# Patient Record
Sex: FEMALE | Race: WHITE | NOT HISPANIC OR LATINO | Employment: FULL TIME | URBAN - METROPOLITAN AREA
[De-identification: names, ages, dates, MRNs, and addresses within clinical notes are randomized per-mention and may not be internally consistent; named-entity substitution may affect disease eponyms.]

---

## 2023-01-20 ENCOUNTER — OFFICE VISIT (OUTPATIENT)
Dept: FAMILY MEDICINE | Facility: CLINIC | Age: 33
End: 2023-01-20
Payer: COMMERCIAL

## 2023-01-20 VITALS
TEMPERATURE: 98.5 F | SYSTOLIC BLOOD PRESSURE: 113 MMHG | WEIGHT: 130 LBS | OXYGEN SATURATION: 100 % | HEART RATE: 79 BPM | DIASTOLIC BLOOD PRESSURE: 75 MMHG | BODY MASS INDEX: 19.7 KG/M2 | HEIGHT: 68 IN

## 2023-01-20 DIAGNOSIS — K52.9 CHRONIC DIARRHEA: Primary | ICD-10-CM

## 2023-01-20 DIAGNOSIS — R05.2 SUBACUTE COUGH: ICD-10-CM

## 2023-01-20 LAB
ALBUMIN SERPL-MCNC: 3.9 G/DL (ref 3.4–5)
ALP SERPL-CCNC: 48 U/L (ref 40–150)
ALT SERPL W P-5'-P-CCNC: 19 U/L (ref 0–50)
ANION GAP SERPL CALCULATED.3IONS-SCNC: 7 MMOL/L (ref 3–14)
AST SERPL W P-5'-P-CCNC: 12 U/L (ref 0–45)
BILIRUB SERPL-MCNC: 0.5 MG/DL (ref 0.2–1.3)
BUN SERPL-MCNC: 17 MG/DL (ref 7–30)
CALCIUM SERPL-MCNC: 9.1 MG/DL (ref 8.5–10.1)
CHLORIDE BLD-SCNC: 108 MMOL/L (ref 94–109)
CO2 SERPL-SCNC: 25 MMOL/L (ref 20–32)
CREAT SERPL-MCNC: 0.83 MG/DL (ref 0.52–1.04)
ERYTHROCYTE [DISTWIDTH] IN BLOOD BY AUTOMATED COUNT: 16.1 % (ref 10–15)
GFR SERPL CREATININE-BSD FRML MDRD: >90 ML/MIN/1.73M2
GLUCOSE BLD-MCNC: 95 MG/DL (ref 70–99)
HCT VFR BLD AUTO: 37.3 % (ref 35–47)
HGB BLD-MCNC: 12.2 G/DL (ref 11.7–15.7)
MCH RBC QN AUTO: 29.3 PG (ref 26.5–33)
MCHC RBC AUTO-ENTMCNC: 32.7 G/DL (ref 31.5–36.5)
MCV RBC AUTO: 89 FL (ref 78–100)
PLATELET # BLD AUTO: 199 10E3/UL (ref 150–450)
POTASSIUM BLD-SCNC: 4.1 MMOL/L (ref 3.4–5.3)
PROT SERPL-MCNC: 7.5 G/DL (ref 6.8–8.8)
RBC # BLD AUTO: 4.17 10E6/UL (ref 3.8–5.2)
SODIUM SERPL-SCNC: 140 MMOL/L (ref 133–144)
TSH SERPL DL<=0.005 MIU/L-ACNC: 2.6 MU/L (ref 0.4–4)
WBC # BLD AUTO: 6 10E3/UL (ref 4–11)

## 2023-01-20 PROCEDURE — 36415 COLL VENOUS BLD VENIPUNCTURE: CPT | Performed by: NURSE PRACTITIONER

## 2023-01-20 PROCEDURE — 86364 TISS TRNSGLTMNASE EA IG CLAS: CPT | Performed by: NURSE PRACTITIONER

## 2023-01-20 PROCEDURE — 99204 OFFICE O/P NEW MOD 45 MIN: CPT | Performed by: NURSE PRACTITIONER

## 2023-01-20 PROCEDURE — 85027 COMPLETE CBC AUTOMATED: CPT | Performed by: NURSE PRACTITIONER

## 2023-01-20 PROCEDURE — 84443 ASSAY THYROID STIM HORMONE: CPT | Performed by: NURSE PRACTITIONER

## 2023-01-20 PROCEDURE — 80053 COMPREHEN METABOLIC PANEL: CPT | Performed by: NURSE PRACTITIONER

## 2023-01-20 RX ORDER — AZITHROMYCIN 250 MG/1
TABLET, FILM COATED ORAL
Qty: 6 TABLET | Refills: 0 | Status: SHIPPED | OUTPATIENT
Start: 2023-01-20 | End: 2023-01-25

## 2023-01-20 RX ORDER — DICYCLOMINE HYDROCHLORIDE 10 MG/1
10 CAPSULE ORAL 4 TIMES DAILY PRN
Qty: 60 CAPSULE | Refills: 2 | Status: SHIPPED | OUTPATIENT
Start: 2023-01-20

## 2023-01-20 NOTE — PROGRESS NOTES
Assessment & Plan     Chronic diarrhea  Symptoms dating back to around age 16.  Has been told she has IBS in the past.   Labs in process.    Patient given the following information/instructions-  Look up FODMAPS diet and try this.  Would recommend against a bunch of herbs, no evidence that they are effective.     Start Metamucil fiber powder- 1 tablespoon in a glass of water daily.   Start Imodium daily as needed for diarrhea, can take up to 8 pills in a 24 hour period, but would recommend starting with 1 pill daily in the morning.  If you get constipated, back off on the Imodium.   Try dicyclomine up to 4 times per day for abdominal pain and cramping  Referral to GI- schedule now because they will probably be a few months out.   - Adult GI  Referral - Consult Only; Future  - dicyclomine (BENTYL) 10 MG capsule; Take 1 capsule (10 mg) by mouth 4 times daily as needed (abdominal pain, cramping)  - Comprehensive metabolic panel (BMP + Alb, Alk Phos, ALT, AST, Total. Bili, TP)  - Tissue transglutaminase domenico IgA and IgG  - CBC with platelets  - TSH with free T4 reflex    Subacute cough  Lungs clear on exam. Persistent cough following viral illness about 1.5 weeks ago. No fever, SOB or wheezing.  Start zpak.  Follow-up if not improving.   - azithromycin (ZITHROMAX) 250 MG tablet; Take 2 tablets (500 mg) by mouth daily for 1 day, THEN 1 tablet (250 mg) daily for 4 days.        Return in about 2 weeks (around 2/3/2023) for If failure to improve, or sooner if worsening.    Leyla Bartlett CNP  [facility]    Krista Ramirez is a 32 year old, presenting for the following health issues:  Cough      History of Present Illness       Reason for visit:  Stomach intestine colon issues chronic cough left rib cage discomfort getting sick a lot this year  Symptom onset:  More than a month  Symptoms include:  Corgh left rib cage discomfort  Symptom intensity:  Moderate  Symptom progression:   "Staying the same  Had these symptoms before:  No  What makes it worse:  No  What makes it better:  No    She eats 4 or more servings of fruits and vegetables daily.She consumes 1 sweetened beverage(s) daily.She exercises with enough effort to increase her heart rate 9 or less minutes per day.  She exercises with enough effort to increase her heart rate 3 or less days per week.   She is taking medications regularly.       Here to establish care today.   Works as a dental assistant.     Has two concerns today.     Flu around Thanksgiving.  Resolved but had lingering cough.  Cough seemed to get worse, having cough attacks.  Very intense.  Developed pain along lower left rib cage.  Was also doing a lot of snow shoveling around this time.   Pain is mostly positional and with palpation.  No significant pain with inspiration.     Still occasionally has cough.  Not as bad as before.   A lot of throat clearing.    Denies SOB.     Past history of IBS, diagnosed around age 16.  Symptoms included abnormal bowel movements with alternating constipation and diarrhea.  Stabbing abdominal pains.  BM issue now is primarily diarrhea.  Stools are like sludge.  Feels like she is not emptying completely. Around 3 BMs daily.  Went to a functional doctor, tried herbal treatment.  Didn't really help.  Tries to eat healthy.  Has tried a lot of dietary changes, never really helps for long.  Exercises.  Did phone consult with [facility] in October 2022.  States was prescribed two medications, doesn't remember names.  Didn't start either of them.  One of them was $2000 and she couldn't afford it.  Tried diet changes and some herbal supplements- turkey tail, lions diallo and others.  Not helping. Denies previous lab work-up or colonoscopy.  Denies family hx of IBD.       Review of Systems   ROS: 10 point ROS neg other than the symptoms noted above in the HPI.         Objective    /75   Pulse 79   Temp 98.5  F (36.9  C)   Ht 1.727 m (5' 8\")   " Wt 59 kg (130 lb)   SpO2 100%   BMI 19.77 kg/m    Body mass index is 19.77 kg/m .  Physical Exam   GENERAL: healthy, alert and no distress  NECK: no adenopathy, no asymmetry, masses, or scars and thyroid normal to palpation  RESP: lungs clear to auscultation - no rales, rhonchi or wheezes  CV: regular rate and rhythm, normal S1 S2, no S3 or S4, no murmur, click or rub, no peripheral edema and peripheral pulses strong  ABDOMEN: soft, nontender, no hepatosplenomegaly, no masses and bowel sounds normal  MS: no gross musculoskeletal defects noted, no edema  SKIN: no suspicious lesions or rashes  NEURO: Normal strength and tone, mentation intact and speech normal

## 2023-01-20 NOTE — PATIENT INSTRUCTIONS
Cough- start Zpak.  If cough is not fully resolved in the next 2-3 weeks, please let me know and we'll check a chest xray.     Bowels-  Likely IBS  Will check labs today.   Look up FODMAPS diet and try this.  Would recommend against a bunch of herbs, no evidence that they are effective.     Start Metamucil fiber powder- 1 tablespoon in a glass of water daily.   Start Imodium daily as needed for diarrhea, can take up to 8 pills in a 24 hour period, but would recommend starting with 1 pill daily in the morning.  If you get constipated, back off on the Imodium.   Try dicyclomine up to 4 times per day for abdominal pain and cramping    Referral to GI- schedule now because they will probably be a few months out.

## 2023-01-23 LAB
TTG IGA SER-ACNC: 0.5 U/ML
TTG IGG SER-ACNC: 0.7 U/ML

## 2023-01-23 NOTE — TELEPHONE ENCOUNTER
REFERRAL INFORMATION:    Referring Provider:  Leyla Bartlett CNP    Referring Clinic:  [facility]    Reason for Visit/Diagnosis: Chronic diarrhea     FUTURE VISIT INFORMATION:    Appointment Date: 2/17/2023     NOTES STATUS DETAILS   OFFICE NOTE from Referring Provider Internal 1/20/2023 Office visit with ROSELINE Bartlett   OFFICE NOTE from Other Specialist N/A    HOSPITAL DISCHARGE SUMMARY/  ED VISITS N/A    OPERATIVE REPORT N/A    MEDICATION LIST Internal         ENDOSCOPY  N/A    COLONOSCOPY N/A    ERCP N/A    EUS N/A    STOOL TESTING N/A    PERTINENT LABS N/A    PATHOLOGY REPORTS (RELATED) N/A    IMAGING (CT, MRI, EGD, MRCP, Small Bowel Follow Through/SBT, MR/CT Enterography) N/A

## 2023-02-12 ENCOUNTER — HEALTH MAINTENANCE LETTER (OUTPATIENT)
Age: 33
End: 2023-02-12

## 2023-02-17 ENCOUNTER — PRE VISIT (OUTPATIENT)
Dept: GASTROENTEROLOGY | Facility: CLINIC | Age: 33
End: 2023-02-17

## 2023-02-17 ENCOUNTER — OFFICE VISIT (OUTPATIENT)
Dept: GASTROENTEROLOGY | Facility: CLINIC | Age: 33
End: 2023-02-17
Attending: NURSE PRACTITIONER
Payer: COMMERCIAL

## 2023-02-17 VITALS
BODY MASS INDEX: 20 KG/M2 | DIASTOLIC BLOOD PRESSURE: 72 MMHG | WEIGHT: 132 LBS | HEIGHT: 68 IN | SYSTOLIC BLOOD PRESSURE: 108 MMHG

## 2023-02-17 DIAGNOSIS — K58.0 IRRITABLE BOWEL SYNDROME WITH DIARRHEA: Primary | ICD-10-CM

## 2023-02-17 DIAGNOSIS — K52.9 CHRONIC DIARRHEA: ICD-10-CM

## 2023-02-17 PROCEDURE — 99204 OFFICE O/P NEW MOD 45 MIN: CPT | Performed by: NURSE PRACTITIONER

## 2023-02-17 RX ORDER — LOPERAMIDE HCL 2 MG
2 CAPSULE ORAL PRN
COMMUNITY

## 2023-02-17 ASSESSMENT — PAIN SCALES - GENERAL: PAINLEVEL: NO PAIN (0)

## 2023-02-17 NOTE — PATIENT INSTRUCTIONS
"It was a pleasure taking care of you today.  I've included a brief summary of our discussion and care plan from today's visit below.  Please review this information with your primary care provider.  ______________________________________________________________________    My recommendations are summarized as follows:    As we discussed today, I placed a referral to colonoscopy.    2. Continue fiber supplement, slowly increasing the dose to 15 gram a day.    3. Use loperamide (Imodium) as needed. Take 1/2 of the tablet after 2 loose of mushy stools. OK to repeat the dose if needed.    4. Try a low FODMAP diet for a few weeks.    Return to GI Clinic in 3 months to review your progress.    ______________________________________________________________________    Irritable bowel syndrome (IBS)   Irritable bowel syndrome (IBS) is a chronic condition of the digestive system. Its primary symptoms are abdominal pain and changes in bowel habits (eg, constipation and/or diarrhea).  There are a number of theories about how and why irritable bowel syndrome (IBS) develops. Despite intensive research, the cause is not clear.   -One theory suggests that IBS is caused by abnormal contractions of the colon and intestines (hence the term \"spastic bowel,\" which has sometimes been used to describe IBS).   -Some people develop IBS after a severe gastrointestinal infection (eg, Salmonella or Campylobacter, or viruses). However, it is not clear how the infection triggers IBS to develop, and most people with IBS do not have a history of these infections.  -People with IBS who seek medical help are more likely to suffer from anxiety and stress than those who do not seek help. Stress and anxiety are known to affect the intestine; thus, it is likely that anxiety and stress worsen symptoms.    -Food intolerances are common in patients with IBS, raising the possibility that it is caused by food sensitivity or allergy. This theory has been " "difficult to prove, although it continues to be studied.   A number of foods are known to cause symptoms that mimic or aggravate IBS, including dairy products (which contain lactose), legumes (such as beans), and cruciferous vegetables (such as broccoli, cauliflower, [place] sprouts, and cabbage). These foods increase intestinal gas, which can cause cramps.    -Many researchers believe that IBS is caused by heightened sensitivity of the intestines. The medical term for this is \"visceral hyperalgesia.\" This theory proposes that nerves in the bowels are overactive in people with IBS, so that normal amounts of gas or movement are perceived as excessive and painful.     A person with irritable bowel syndrome may have frequent loose stools. Bowel movements usually occur during the daytime, and most often in the morning or after meals. Diarrhea is often preceded by a sense of extreme urgency and followed by a feeling of incomplete emptying. About one-half of people with IBS also notice mucous discharge with diarrhea. Diarrhea occurring during the night is very unusual with IBS.     Treatments are often given to reduce the pain and other symptoms of IBS, and it may be necessary to try more than one combination of treatments to find the one that is most helpful for you.  Diet:  The first step in treating IBS is usually to monitor your symptoms, daily bowel habits, and any other factors that may affect your bowels. This can help to identify factors that worsen symptoms in some people with IBS, such as lactose or other food intolerances and stress. Keeping a daily diary to track your diet and bowel symptoms can be helpful.  Many clinicians recommend temporarily eliminating milk products, since lactose intolerance is common and can aggravate IBS or cause symptoms similar to IBS. The greatest concentration of lactose is found in milk and ice cream, although it is present in smaller quantities in yogurt, cottage and other " cheeses.Try eliminating dairy for 2 weeks. If IBS symptoms improve, it is reasonable to continue avoiding lactose.   Many foods are only partially digested in the small intestines. When they reach the colon (large intestine), further digestion takes place, which may cause gas and cramps. Eliminating these foods temporarily is reasonable if gas or bloating is bothersome.  The most common gas-producing foods are legumes (such as beans) and cruciferous vegetables (such as cabbage, [place] sprouts, cauliflower, and broccoli). In addition, some people have trouble with onions, celery, carrots, raisins, bananas, apricots, prunes, sprouts, and wheat.    A bulk-forming fiber supplement, such as Psyllium, may also be recommended to increase fiber intake since it is difficult to consume enough fiber in the diet. Fiber supplements should be started at a low dose and increased slowly over several weeks to reduce the symptoms of excessive intestinal gas, which can occur in some people when beginning fiber therapy.     Some foods are easy to digest for people with IBS related diarrhea. These include the following: plain pasta or noodles, white rice. Boiled or baked potato. Whole white bread, Taiwanese bread, plain fish, plain chicken or turkey, soft-boiled eggs, rice or soy milk, cooked carrots,and cereals (plain Cornflakes, Rice Krispies, Corn or Rice Chex, or Cheerios).    Other approaches to management of IBS:  Stress and anxiety can worsen IBS in some people. The best approach for reducing stress and anxiety depends upon your situation and the severity of your symptoms.  Although many drugs are available to treat the symptoms of IBS, these drugs do not cure the condition. They are mainly used to relieve symptoms.      FODMAP:  The FODMAP term was coined by Turkmen researchers Juany Warner and Seb Barkley. They found that a low-FODMAP diet helped 75-85% of patients with irritable bowel syndrome or IBS.  Products  containing lactose (dairy), fructose (fruit sugar),sweeteners (sorbitol, mannitol), fructans (a type of fiber found in wheat, onions, garlic and chicory root), and GOS (a type of fiber found in beans, hummus and soy milk) are examples of FODMAPs.  They can be poorly absorbed during the digestive process. They are rapidly fermented by the bacteria that live in your gut. They are capable of pulling fluid into the gut in a process called osmosis. The increased fluid load, along with the type and amount of gas produced, cause distension and motility changes, leading to bloating, abdominal pain, diarrhea, and nausea. Symptoms are often delayed until hours after eating a high FODMAP meal or snack, because it takes time for FODMAPs to make their way through the stomach and into the intestines, where the effects occur. By reducing the overall dietary load of these carbohydrates, troublesome GI symptoms can be minimized or eliminated.     A low-FODMAP diet avoids foods containing certain sugars and certain fibers capable of causing diarrhea, constipation, gas, bloating and abdominal pain in people with IBS. At the same time, I would recommend not to exclude all of the FODMAPs foods completely from your diet, but instead, to use alternative foods from the same group. Try the diet for 4-6 weeks, if you have not noticed significant changes in your symptoms, stop the diet. Discuss further plan with your primary care provider or gastroenterology provider.              ______________________________________________________________________    Who do I call with any questions after my visit?  Please be in touch if there are any further questions that arise following today's visit.  There are multiple ways to contact your gastroenterology care team.      During business hours, you may reach a Gastroenterology nurse at [phone], option 3.     To schedule or reschedule an appointment, please call [phone].   To schedule your  imaging studies (CT, MRI, ultrasound)  call [phone] (or toll-free # [phone])  To schedule your lab work at [facility], please call [phone]    You can always send a secure message through Augur.  Augur messages are answered by your nurse or doctor typically within 24 hours.  Please allow extra time on weekends and holidays.      For urgent/emergent questions after business hours, you may reach the on-call GI Fellow by contacting the [facility]  at [phone].    In order for your refill to be processed in a timely fashion, it is your responsibility to ensure you follow the recommendations from your provider regarding your laboratory studies and follow up appointments.       How will I get the results of any tests ordered?    You will receive all of your results.  If you have signed up for InforS[employer], any tests ordered at your visit will be available to you after your physician reviews them.  Typically this takes 1-2 weeks.  If there are urgent results that require a change in your care plan, your physician or nurse will call you to discuss the next steps.   What is Augur?  Augur is a secure way for you to access all of your healthcare records from the [facility].  It is a web based computer program, so you can sign on to it from any location.  It also allows you to send secure messages to your care team.  I recommend signing up for Augur access if you have not already done so and are comfortable with using a computer.    How to I schedule a follow-up visit?  If you did not schedule a follow-up visit today, please call [phone] to schedule a follow-up office visit.      Sincerely,  GEOVANNY Mcguire,  [facility],  Division of Gastroenterology   ([facility])

## 2023-02-17 NOTE — PROGRESS NOTES
"Gastroenterology CLINIC VISIT, NEW PATIENT    CC/REFERRING PROVIDER: Leyla Bartlett  REASON FOR CONSULTATION: Chronic diarrhea    HPI: 32 year old female presenting to GI clinic for evaluation and management of chronic diarrhea.  Patient stated that she has been having diarrhea since her childhood, but over the past few years it became worse.  Stated that she used to have alternation of diarrhea and formed stools, but now they are always \"slushy\" and sticky.  She was able to control her symptoms with diet before, but now it does not work. Noted correlation between anxiety and stress and her symptoms.   Stated that a few months ago she had significant amount of abdominal cramping and diarrhea.  Says she tried a very strict diet -mainly consuming rice, applesauce and some protein.  Noted improvement in her symptoms in a few weeks, but it has a temporarily effect only.  She was seen by homeopathic provider for about 10 weeks.  Said that initially the treatment was helping. Patient also tried over-the-counter tinctures (turkey tail) with no significant improvement in her symptoms.    She suspects that she might have hemorrhoids.  Experiences episodes of anal burning and itching.  Tried Preparation H in the past.  Currently, she is using witch hazel wipes, which are somehow effective.  Patient stated that she is feeling frustrated and tired of not being able to have a normal formed bowel movement.  She requests additional evaluation or studies to find out if she potentially could have Crohn disease or any other pathology of her colon.    Patient stated that she saw gastroenterology at [facility] approximately 2 years ago.  Was prescribed \"an expensive antibiotic\" which she could not afford.  Recently, she was seen by her PCP who recommended to try Metamucil, dicyclomine, and Imodium.  Had lab work that came back unremarkable- normal TSH, CBC, TTG, and CMP.  Patient stated that she started using a dextran fiber, but did " "not try dicyclomine or Imodium.    ROS: 10pt ROS performed and otherwise negative.    PAST MEDICAL HISTORY:  No past medical history on file.    PREVIOUS ABDOMINAL/GYNECOLOGIC SURGERIES:    No past surgical history on file.      PERTINENT MEDICATIONS:  Current Outpatient Medications   Medication Sig Dispense Refill     dicyclomine (BENTYL) 10 MG capsule Take 1 capsule (10 mg) by mouth 4 times daily as needed (abdominal pain, cramping) 60 capsule 2     loperamide (IMODIUM) 2 MG capsule Take 2 mg by mouth as needed for diarrhea       psyllium (METAMUCIL/KONSYL) 58.6 % powder Take by mouth daily         No other OTC/herbal/supplements reported by patient.    SOCIAL HISTORY:  Social History     Socioeconomic History     Marital status: Single     Spouse name: Not on file     Number of children: Not on file     Years of education: Not on file     Highest education level: Not on file   Occupational History     Not on file   Tobacco Use     Smoking status: Never     Smokeless tobacco: Never   Vaping Use     Vaping Use: Never used   Substance and Sexual Activity     Alcohol use: Not on file     Drug use: Not on file     Sexual activity: Not on file   Other Topics Concern     Not on file   Social History Narrative     Not on file     Social Determinants of Health     Financial Resource Strain: Not on file   Food Insecurity: Not on file   Transportation Needs: Not on file   Physical Activity: Not on file   Stress: Not on file   Social Connections: Not on file   Intimate Partner Violence: Not on file   Housing Stability: Not on file       FAMILY HISTORY:  Denies colon/panc/esophageal/other GI CA, no other Davalos or other HPS-related John. No IBD/celiac, no other AI/liver/thyroid disease.    No family history on file.    PHYSICAL EXAMINATION:  Vitals reviewed  /72 (BP Location: Right arm, Patient Position: Sitting, Cuff Size: Adult Regular)   Ht 1.727 m (5' 8\")   Wt 59.9 kg (132 lb)   LMP 02/10/2023 (Exact Date)   " "Breastfeeding No   BMI 20.07 kg/m      General: Patient appears well in no acute distress.   Skin: No visualized rash or lesions on visualized skin  Eyes: EOMI, no erythema, sclera icterus or discharge noted  Resp: breathing comfortably without accessory muscle usage, speaking in full sentences, no cough  Lung sounds clear  Card: Regular and rhythmic S1 and S2. No murmur,gallop, or rub  Abdomen: Active bowel sounds X 4 quadrants. Soft to palpation, no guarding or rebound tenderness   MSK: Appears to have normal range of motion based on visualized movements  Neurologic: No apparent tremors, facial movements symmetric  Psych: affect normal, alert and oriented      PERTINENT STUDIES Reviewed in EMR    ASSESSMENT/PLAN:  32 year old female  presented  to GI clinic for evaluation and management of persistent soft to loose stools with some associated belching and bloating.  Patient reported having 2-3 loose stools a day.  Onset of symptoms in her teen age.  Patient reported that her stool consistency and frequency becomes worse in the past 2 years.  Stated that she now always has \"slushy\" stools.  Complains of occasional abdominal cramping.  Denies any red flag symptoms. She denies any weight loss, black stools, fever or chills, abdominal pain, nausea or vomiting, dysphagia, fatigue, joint pain, or skin rash.  Recently, she was screened for anemia and celiac disease and had negative test results.    We had a long discussion on  IBS-D as the most likely etiology of her symptoms.  We also talked about infection, malabsorption, IBD, microscopic colitis, and food intolerance and other possible causes of diarrhea.  Patient denies family history of IBD.  She does not smoke, does not consume alcohol, does not use illicit drugs.  Has not been taking any NSAIDs.  I recommended to start a small dose of tricyclic antidepressant, optimize fiber supplementation, use loperamide as needed, and try a low FODMAP diet.  Patient prefers to " proceed with colonoscopy to exclude any other potential etiologies of her symptoms.  Order was placed.  I also offered hydrocortisone and Americaine creams for management of her anal discomfort, but patient declined.  She stated that witch hazel works pretty well at this time.      ICD-10-CM    1. Irritable bowel syndrome with diarrhea  K58.0 Adult GI  Referral - Procedure Only      2. Chronic diarrhea  K52.9 Adult GI  Referral - Consult Only     Adult GI  Referral - Procedure Only          Patient verbalized understanding and appreciation of care provided. Stated that all of the questions were answered to her/his satisfaction.    Additional 12 minutes on the date of service was spent performing the following:   -Preparing to see the patient (eg, review of tests,providers progress notes, medical/surgical history,etc)   -Ordering medications, tests, or procedures   -Documenting clinical information in the electronic or other health record     RTC in 3 months    Thank you for this consultation. It was a pleasure to participate in the care of this patient; please contact us with any further questions.    GEREMIAS Mcguire, FNP-C  [facility]  Gastroenterology Department  [place], [place]    This note was created with Dragon voice recognition software, and while reviewed for accuracy, inadvertent minor typographic errors may occur. Please contact the provider if you have any questions.

## 2023-02-17 NOTE — LETTER
"    2/17/2023         RE: Ashley Salazar  [address] [facility] [address]        Dear Colleague,    Thank you for referring your patient, Ashley Salazar, to the [facility]. Please see a copy of my visit note below.    Gastroenterology CLINIC VISIT, NEW PATIENT    CC/REFERRING PROVIDER: Leyla Bartlett  REASON FOR CONSULTATION: Chronic diarrhea    HPI: 32 year old female presenting to GI clinic for evaluation and management of chronic diarrhea.  Patient stated that she has been having diarrhea since her childhood, but over the past few years it became worse.  Stated that she used to have alternation of diarrhea and formed stools, but now they are always \"slushy\" and sticky.  She was able to control her symptoms with diet before, but now it does not work. Noted correlation between anxiety and stress and her symptoms.   Stated that a few months ago she had significant amount of abdominal cramping and diarrhea.  Says she tried a very strict diet -mainly consuming rice, applesauce and some protein.  Noted improvement in her symptoms in a few weeks, but it has a temporarily effect only.  She was seen by homeopathic provider for about 10 weeks.  Said that initially the treatment was helping. Patient also tried over-the-counter tinctures (turkey tail) with no significant improvement in her symptoms.    She suspects that she might have hemorrhoids.  Experiences episodes of anal burning and itching.  Tried Preparation H in the past.  Currently, she is using witch hazel wipes, which are somehow effective.  Patient stated that she is feeling frustrated and tired of not being able to have a normal formed bowel movement.  She requests additional evaluation or studies to find out if she potentially could have Crohn disease or any other pathology of her colon.    Patient stated that she saw gastroenterology at [facility] approximately 2 years ago.  Was prescribed \"an expensive antibiotic\" which she could not " afford.  Recently, she was seen by her PCP who recommended to try Metamucil, dicyclomine, and Imodium.  Had lab work that came back unremarkable- normal TSH, CBC, TTG, and CMP.  Patient stated that she started using a dextran fiber, but did not try dicyclomine or Imodium.    ROS: 10pt ROS performed and otherwise negative.    PAST MEDICAL HISTORY:  No past medical history on file.    PREVIOUS ABDOMINAL/GYNECOLOGIC SURGERIES:    No past surgical history on file.      PERTINENT MEDICATIONS:  Current Outpatient Medications   Medication Sig Dispense Refill     dicyclomine (BENTYL) 10 MG capsule Take 1 capsule (10 mg) by mouth 4 times daily as needed (abdominal pain, cramping) 60 capsule 2     loperamide (IMODIUM) 2 MG capsule Take 2 mg by mouth as needed for diarrhea       psyllium (METAMUCIL/KONSYL) 58.6 % powder Take by mouth daily         No other OTC/herbal/supplements reported by patient.    SOCIAL HISTORY:  Social History     Socioeconomic History     Marital status: Single     Spouse name: Not on file     Number of children: Not on file     Years of education: Not on file     Highest education level: Not on file   Occupational History     Not on file   Tobacco Use     Smoking status: Never     Smokeless tobacco: Never   Vaping Use     Vaping Use: Never used   Substance and Sexual Activity     Alcohol use: Not on file     Drug use: Not on file     Sexual activity: Not on file   Other Topics Concern     Not on file   Social History Narrative     Not on file     Social Determinants of Health     Financial Resource Strain: Not on file   Food Insecurity: Not on file   Transportation Needs: Not on file   Physical Activity: Not on file   Stress: Not on file   Social Connections: Not on file   Intimate Partner Violence: Not on file   Housing Stability: Not on file       FAMILY HISTORY:  Denies colon/panc/esophageal/other GI CA, no other Davalos or other HPS-related John. No IBD/celiac, no other AI/liver/thyroid  "disease.    No family history on file.    PHYSICAL EXAMINATION:  Vitals reviewed  /72 (BP Location: Right arm, Patient Position: Sitting, Cuff Size: Adult Regular)   Ht 1.727 m (5' 8\")   Wt 59.9 kg (132 lb)   LMP 02/10/2023 (Exact Date)   Breastfeeding No   BMI 20.07 kg/m      General: Patient appears well in no acute distress.   Skin: No visualized rash or lesions on visualized skin  Eyes: EOMI, no erythema, sclera icterus or discharge noted  Resp: breathing comfortably without accessory muscle usage, speaking in full sentences, no cough  Lung sounds clear  Card: Regular and rhythmic S1 and S2. No murmur,gallop, or rub  Abdomen: Active bowel sounds X 4 quadrants. Soft to palpation, no guarding or rebound tenderness   MSK: Appears to have normal range of motion based on visualized movements  Neurologic: No apparent tremors, facial movements symmetric  Psych: affect normal, alert and oriented      PERTINENT STUDIES Reviewed in EMR    ASSESSMENT/PLAN:  32 year old female  presented  to GI clinic for evaluation and management of persistent soft to loose stools with some associated belching and bloating.  Patient reported having 2-3 loose stools a day.  Onset of symptoms in her teen age.  Patient reported that her stool consistency and frequency becomes worse in the past 2 years.  Stated that she now always has \"slushy\" stools.  Complains of occasional abdominal cramping.  Denies any red flag symptoms. She denies any weight loss, black stools, fever or chills, abdominal pain, nausea or vomiting, dysphagia, fatigue, joint pain, or skin rash.  Recently, she was screened for anemia and celiac disease and had negative test results.    We had a long discussion on  IBS-D as the most likely etiology of her symptoms.  We also talked about infection, malabsorption, IBD, microscopic colitis, and food intolerance and other possible causes of diarrhea.  Patient denies family history of IBD.  She does not smoke, does not " consume alcohol, does not use illicit drugs.  Has not been taking any NSAIDs.  I recommended to start a small dose of tricyclic antidepressant, optimize fiber supplementation, use loperamide as needed, and try a low FODMAP diet.  Patient prefers to proceed with colonoscopy to exclude any other potential etiologies of her symptoms.  Order was placed.  I also offered hydrocortisone and Americaine creams for management of her anal discomfort, but patient declined.  She stated that witch hazel works pretty well at this time.      ICD-10-CM    1. Irritable bowel syndrome with diarrhea  K58.0 Adult GI  Referral - Procedure Only      2. Chronic diarrhea  K52.9 Adult GI  Referral - Consult Only     Adult GI  Referral - Procedure Only          Patient verbalized understanding and appreciation of care provided. Stated that all of the questions were answered to her/his satisfaction.    Additional 12 minutes on the date of service was spent performing the following:   -Preparing to see the patient (eg, review of tests,providers progress notes, medical/surgical history,etc)   -Ordering medications, tests, or procedures   -Documenting clinical information in the electronic or other health record     RTC in 3 months    Thank you for this consultation. It was a pleasure to participate in the care of this patient; please contact us with any further questions.    GEREMIAS Mcguire, FNP-C  [facility]  Gastroenterology Department  [place], [place]    This note was created with Dragon voice recognition software, and while reviewed for accuracy, inadvertent minor typographic errors may occur. Please contact the provider if you have any questions.      Again, thank you for allowing me to participate in the care of your patient.        Sincerely,        GEREMIAS MCGUIRE CNP

## 2024-03-10 ENCOUNTER — HEALTH MAINTENANCE LETTER (OUTPATIENT)
Age: 34
End: 2024-03-10

## 2025-03-16 ENCOUNTER — HEALTH MAINTENANCE LETTER (OUTPATIENT)
Age: 35
End: 2025-03-16